# Patient Record
Sex: FEMALE | Race: WHITE | NOT HISPANIC OR LATINO | URBAN - METROPOLITAN AREA
[De-identification: names, ages, dates, MRNs, and addresses within clinical notes are randomized per-mention and may not be internally consistent; named-entity substitution may affect disease eponyms.]

---

## 2017-11-27 ENCOUNTER — OFFICE (OUTPATIENT)
Dept: URBAN - METROPOLITAN AREA CLINIC 10 | Facility: CLINIC | Age: 62
End: 2017-11-27
Payer: COMMERCIAL

## 2017-11-27 VITALS
SYSTOLIC BLOOD PRESSURE: 153 MMHG | WEIGHT: 170 LBS | HEART RATE: 65 BPM | HEIGHT: 62 IN | DIASTOLIC BLOOD PRESSURE: 80 MMHG

## 2017-11-27 DIAGNOSIS — K58.9 IRRITABLE BOWEL SYNDROME WITHOUT DIARRHEA: ICD-10-CM

## 2017-11-27 PROCEDURE — 99212 OFFICE O/P EST SF 10 MIN: CPT | Performed by: INTERNAL MEDICINE

## 2017-11-27 RX ORDER — RANITIDINE 300 MG/1
TABLET ORAL
Qty: 60 | Refills: 5 | Status: COMPLETED
End: 2017-11-27

## 2018-05-21 ENCOUNTER — OFFICE (OUTPATIENT)
Dept: URBAN - METROPOLITAN AREA CLINIC 10 | Facility: CLINIC | Age: 63
End: 2018-05-21

## 2018-05-21 VITALS
SYSTOLIC BLOOD PRESSURE: 130 MMHG | DIASTOLIC BLOOD PRESSURE: 61 MMHG | HEART RATE: 86 BPM | HEIGHT: 62 IN | WEIGHT: 158 LBS

## 2018-05-21 DIAGNOSIS — C18.9 MALIGNANT NEOPLASM OF COLON, UNSPECIFIED: ICD-10-CM

## 2018-05-21 LAB
MICROSCOPIC EXAMINATION: BACTERIA: (no result)
MICROSCOPIC EXAMINATION: CAST TYPE: (no result)
MICROSCOPIC EXAMINATION: CASTS: (no result)
MICROSCOPIC EXAMINATION: COMMENT: (no result)
MICROSCOPIC EXAMINATION: CRYSTAL TYPE: (no result)
MICROSCOPIC EXAMINATION: CRYSTALS: (no result)
MICROSCOPIC EXAMINATION: EPITHELIAL CELLS (NON RENAL): (no result) /HPF
MICROSCOPIC EXAMINATION: EPITHELIAL CELLS (RENAL): (no result)
MICROSCOPIC EXAMINATION: MUCUS THREADS: PRESENT
MICROSCOPIC EXAMINATION: RBC: (no result) /HPF
MICROSCOPIC EXAMINATION: TRICHOMONAS: (no result)
MICROSCOPIC EXAMINATION: WBC: (no result) /HPF
MICROSCOPIC EXAMINATION: YEAST: (no result)
URINALYSIS, COMPLETE: APPEARANCE: CLEAR
URINALYSIS, COMPLETE: BILIRUBIN: NEGATIVE
URINALYSIS, COMPLETE: GLUCOSE: NEGATIVE
URINALYSIS, COMPLETE: KETONES: NEGATIVE
URINALYSIS, COMPLETE: MICROSCOPIC EXAMINATION: (no result)
URINALYSIS, COMPLETE: MICROSCOPIC EXAMINATION: (no result)
URINALYSIS, COMPLETE: NITRITE, URINE: NEGATIVE
URINALYSIS, COMPLETE: OCCULT BLOOD: NEGATIVE
URINALYSIS, COMPLETE: PH: 8 — HIGH (ref 5–7.5)
URINALYSIS, COMPLETE: PROTEIN: NEGATIVE
URINALYSIS, COMPLETE: SPECIFIC GRAVITY: 1.02 (ref 1–1.03)
URINALYSIS, COMPLETE: URINE-COLOR: YELLOW
URINALYSIS, COMPLETE: UROBILINOGEN,SEMI-QN: 0.2 MG/DL (ref 0.2–1)
URINALYSIS, COMPLETE: WBC ESTERASE: NEGATIVE

## 2018-05-21 PROCEDURE — 99213 OFFICE O/P EST LOW 20 MIN: CPT | Performed by: INTERNAL MEDICINE

## 2018-05-21 RX ORDER — POLYETHYLENE GLYCOL 3350, SODIUM SULFATE, SODIUM CHLORIDE, POTASSIUM CHLORIDE, ASCORBIC ACID, SODIUM ASCORBATE 7.5-2.691G
KIT ORAL
Qty: 1 | Refills: 0 | Status: COMPLETED
Start: 2018-05-21 | End: 2018-07-19

## 2018-07-19 ENCOUNTER — OFFICE (OUTPATIENT)
Dept: URBAN - METROPOLITAN AREA PATHOLOGY 22 | Facility: PATHOLOGY | Age: 63
End: 2018-07-19

## 2018-07-19 ENCOUNTER — AMBULATORY SURGICAL CENTER (OUTPATIENT)
Dept: URBAN - METROPOLITAN AREA SURGERY 1 | Facility: SURGERY | Age: 63
End: 2018-07-19

## 2018-07-19 ENCOUNTER — AMBULATORY SURGICAL CENTER (OUTPATIENT)
Dept: URBAN - METROPOLITAN AREA SURGERY 1 | Facility: SURGERY | Age: 63
End: 2018-07-19
Payer: COMMERCIAL

## 2018-07-19 VITALS
DIASTOLIC BLOOD PRESSURE: 72 MMHG | OXYGEN SATURATION: 98 % | RESPIRATION RATE: 16 BRPM | TEMPERATURE: 99.6 F | TEMPERATURE: 98.3 F | SYSTOLIC BLOOD PRESSURE: 127 MMHG | SYSTOLIC BLOOD PRESSURE: 141 MMHG | TEMPERATURE: 98.3 F | SYSTOLIC BLOOD PRESSURE: 127 MMHG | HEIGHT: 62 IN | WEIGHT: 155 LBS | HEART RATE: 86 BPM | DIASTOLIC BLOOD PRESSURE: 70 MMHG | DIASTOLIC BLOOD PRESSURE: 61 MMHG | SYSTOLIC BLOOD PRESSURE: 138 MMHG | SYSTOLIC BLOOD PRESSURE: 141 MMHG | HEART RATE: 83 BPM | WEIGHT: 155 LBS | HEIGHT: 62 IN | DIASTOLIC BLOOD PRESSURE: 69 MMHG | DIASTOLIC BLOOD PRESSURE: 62 MMHG | DIASTOLIC BLOOD PRESSURE: 72 MMHG | DIASTOLIC BLOOD PRESSURE: 69 MMHG | DIASTOLIC BLOOD PRESSURE: 61 MMHG | DIASTOLIC BLOOD PRESSURE: 70 MMHG | HEART RATE: 70 BPM | OXYGEN SATURATION: 99 % | HEART RATE: 79 BPM | OXYGEN SATURATION: 98 % | HEART RATE: 79 BPM | SYSTOLIC BLOOD PRESSURE: 134 MMHG | RESPIRATION RATE: 16 BRPM | OXYGEN SATURATION: 96 % | HEART RATE: 70 BPM | TEMPERATURE: 99.6 F | HEART RATE: 83 BPM | HEART RATE: 86 BPM | SYSTOLIC BLOOD PRESSURE: 138 MMHG | DIASTOLIC BLOOD PRESSURE: 62 MMHG | OXYGEN SATURATION: 99 % | OXYGEN SATURATION: 96 % | SYSTOLIC BLOOD PRESSURE: 135 MMHG | SYSTOLIC BLOOD PRESSURE: 135 MMHG | SYSTOLIC BLOOD PRESSURE: 134 MMHG

## 2018-07-19 DIAGNOSIS — K62.89 OTHER SPECIFIED DISEASES OF ANUS AND RECTUM: ICD-10-CM

## 2018-07-19 DIAGNOSIS — Z15.09 GENETIC SUSCEPTIBILITY TO OTHER MALIGNANT NEOPLASM: ICD-10-CM

## 2018-07-19 DIAGNOSIS — K29.50 UNSPECIFIED CHRONIC GASTRITIS WITHOUT BLEEDING: ICD-10-CM

## 2018-07-19 DIAGNOSIS — D12.2 BENIGN NEOPLASM OF ASCENDING COLON: ICD-10-CM

## 2018-07-19 DIAGNOSIS — K63.5 POLYP OF COLON: ICD-10-CM

## 2018-07-19 DIAGNOSIS — K31.7 POLYP OF STOMACH AND DUODENUM: ICD-10-CM

## 2018-07-19 PROBLEM — Z86.010 SURVEILLANCE DUE TO PRIOR COLONIC NEOPLASIA: Status: ACTIVE | Noted: 2018-07-19

## 2018-07-19 PROCEDURE — 88342 IMHCHEM/IMCYTCHM 1ST ANTB: CPT | Performed by: INTERNAL MEDICINE

## 2018-07-19 PROCEDURE — 43239 EGD BIOPSY SINGLE/MULTIPLE: CPT | Mod: 51 | Performed by: INTERNAL MEDICINE

## 2018-07-19 PROCEDURE — 88313 SPECIAL STAINS GROUP 2: CPT | Performed by: INTERNAL MEDICINE

## 2018-07-19 PROCEDURE — 45380 COLONOSCOPY AND BIOPSY: CPT | Mod: 33 | Performed by: INTERNAL MEDICINE

## 2018-07-19 PROCEDURE — 88305 TISSUE EXAM BY PATHOLOGIST: CPT | Performed by: INTERNAL MEDICINE

## 2018-07-19 PROCEDURE — 88341 IMHCHEM/IMCYTCHM EA ADD ANTB: CPT | Performed by: INTERNAL MEDICINE

## 2018-11-19 ENCOUNTER — OFFICE (OUTPATIENT)
Dept: URBAN - METROPOLITAN AREA CLINIC 10 | Facility: CLINIC | Age: 63
End: 2018-11-19
Payer: COMMERCIAL

## 2018-11-19 VITALS
WEIGHT: 162 LBS | DIASTOLIC BLOOD PRESSURE: 63 MMHG | HEART RATE: 79 BPM | SYSTOLIC BLOOD PRESSURE: 122 MMHG | HEIGHT: 62 IN

## 2018-11-19 DIAGNOSIS — K59.00 CONSTIPATION, UNSPECIFIED: ICD-10-CM

## 2018-11-19 DIAGNOSIS — R10.32 LEFT LOWER QUADRANT PAIN: ICD-10-CM

## 2018-11-19 PROCEDURE — 99213 OFFICE O/P EST LOW 20 MIN: CPT | Performed by: INTERNAL MEDICINE

## 2018-11-19 RX ORDER — PANTOPRAZOLE SODIUM 40 MG/1
TABLET, DELAYED RELEASE ORAL
Qty: 90 | Refills: 3 | Status: COMPLETED
End: 2018-11-19

## 2019-05-20 ENCOUNTER — OFFICE (OUTPATIENT)
Dept: URBAN - METROPOLITAN AREA CLINIC 10 | Facility: CLINIC | Age: 64
End: 2019-05-20

## 2019-05-20 VITALS
HEART RATE: 79 BPM | DIASTOLIC BLOOD PRESSURE: 71 MMHG | HEIGHT: 62 IN | SYSTOLIC BLOOD PRESSURE: 127 MMHG | WEIGHT: 161 LBS

## 2019-05-20 DIAGNOSIS — Z15.09 GENETIC SUSCEPTIBILITY TO OTHER MALIGNANT NEOPLASM: ICD-10-CM

## 2019-05-20 DIAGNOSIS — R14.0 ABDOMINAL DISTENSION (GASEOUS): ICD-10-CM

## 2019-05-20 PROBLEM — Z12.11 SCREENING FOR COLONIC NEOPLASIA: Status: ACTIVE | Noted: 2018-07-19

## 2019-05-20 PROCEDURE — 99213 OFFICE O/P EST LOW 20 MIN: CPT | Performed by: INTERNAL MEDICINE

## 2019-07-22 ENCOUNTER — AMBULATORY SURGICAL CENTER (OUTPATIENT)
Dept: URBAN - METROPOLITAN AREA SURGERY 1 | Facility: SURGERY | Age: 64
End: 2019-07-22

## 2019-07-22 ENCOUNTER — OFFICE (OUTPATIENT)
Dept: URBAN - METROPOLITAN AREA PATHOLOGY 22 | Facility: PATHOLOGY | Age: 64
End: 2019-07-22

## 2019-07-22 VITALS
SYSTOLIC BLOOD PRESSURE: 125 MMHG | SYSTOLIC BLOOD PRESSURE: 117 MMHG | HEIGHT: 62 IN | SYSTOLIC BLOOD PRESSURE: 129 MMHG | HEART RATE: 71 BPM | HEART RATE: 74 BPM | HEART RATE: 67 BPM | RESPIRATION RATE: 15 BRPM | HEART RATE: 73 BPM | SYSTOLIC BLOOD PRESSURE: 109 MMHG | DIASTOLIC BLOOD PRESSURE: 62 MMHG | DIASTOLIC BLOOD PRESSURE: 62 MMHG | HEART RATE: 74 BPM | OXYGEN SATURATION: 97 % | OXYGEN SATURATION: 97 % | HEIGHT: 62 IN | DIASTOLIC BLOOD PRESSURE: 76 MMHG | HEART RATE: 67 BPM | OXYGEN SATURATION: 99 % | RESPIRATION RATE: 10 BRPM | DIASTOLIC BLOOD PRESSURE: 66 MMHG | RESPIRATION RATE: 15 BRPM | HEART RATE: 73 BPM | SYSTOLIC BLOOD PRESSURE: 129 MMHG | OXYGEN SATURATION: 94 % | RESPIRATION RATE: 16 BRPM | TEMPERATURE: 98.8 F | HEART RATE: 77 BPM | OXYGEN SATURATION: 98 % | HEART RATE: 71 BPM | SYSTOLIC BLOOD PRESSURE: 109 MMHG | OXYGEN SATURATION: 94 % | DIASTOLIC BLOOD PRESSURE: 66 MMHG | TEMPERATURE: 97.7 F | SYSTOLIC BLOOD PRESSURE: 125 MMHG | OXYGEN SATURATION: 99 % | HEART RATE: 74 BPM | RESPIRATION RATE: 16 BRPM | OXYGEN SATURATION: 98 % | DIASTOLIC BLOOD PRESSURE: 62 MMHG | SYSTOLIC BLOOD PRESSURE: 125 MMHG | HEART RATE: 73 BPM | HEART RATE: 77 BPM | WEIGHT: 156 LBS | DIASTOLIC BLOOD PRESSURE: 76 MMHG | RESPIRATION RATE: 10 BRPM | HEIGHT: 62 IN | WEIGHT: 156 LBS | DIASTOLIC BLOOD PRESSURE: 66 MMHG | OXYGEN SATURATION: 97 % | SYSTOLIC BLOOD PRESSURE: 139 MMHG | DIASTOLIC BLOOD PRESSURE: 76 MMHG | RESPIRATION RATE: 15 BRPM | TEMPERATURE: 97.7 F | OXYGEN SATURATION: 99 % | SYSTOLIC BLOOD PRESSURE: 117 MMHG | WEIGHT: 156 LBS | SYSTOLIC BLOOD PRESSURE: 129 MMHG | RESPIRATION RATE: 10 BRPM | TEMPERATURE: 97.7 F | TEMPERATURE: 98.8 F | SYSTOLIC BLOOD PRESSURE: 109 MMHG | OXYGEN SATURATION: 98 % | HEART RATE: 67 BPM | SYSTOLIC BLOOD PRESSURE: 139 MMHG | HEART RATE: 71 BPM | HEART RATE: 77 BPM | SYSTOLIC BLOOD PRESSURE: 117 MMHG | RESPIRATION RATE: 16 BRPM | SYSTOLIC BLOOD PRESSURE: 139 MMHG | OXYGEN SATURATION: 94 % | TEMPERATURE: 98.8 F

## 2019-07-22 DIAGNOSIS — K31.89 OTHER DISEASES OF STOMACH AND DUODENUM: ICD-10-CM

## 2019-07-22 DIAGNOSIS — Z15.09 GENETIC SUSCEPTIBILITY TO OTHER MALIGNANT NEOPLASM: ICD-10-CM

## 2019-07-22 DIAGNOSIS — K31.7 POLYP OF STOMACH AND DUODENUM: ICD-10-CM

## 2019-07-22 DIAGNOSIS — R14.0 ABDOMINAL DISTENSION (GASEOUS): ICD-10-CM

## 2019-07-22 DIAGNOSIS — K29.50 UNSPECIFIED CHRONIC GASTRITIS WITHOUT BLEEDING: ICD-10-CM

## 2019-07-22 DIAGNOSIS — K62.89 OTHER SPECIFIED DISEASES OF ANUS AND RECTUM: ICD-10-CM

## 2019-07-22 PROCEDURE — 43239 EGD BIOPSY SINGLE/MULTIPLE: CPT | Mod: 51 | Performed by: INTERNAL MEDICINE

## 2019-07-22 PROCEDURE — 88313 SPECIAL STAINS GROUP 2: CPT | Performed by: INTERNAL MEDICINE

## 2019-07-22 PROCEDURE — 45378 DIAGNOSTIC COLONOSCOPY: CPT | Performed by: INTERNAL MEDICINE

## 2019-07-22 PROCEDURE — 88305 TISSUE EXAM BY PATHOLOGIST: CPT | Performed by: INTERNAL MEDICINE

## 2019-07-22 PROCEDURE — 88342 IMHCHEM/IMCYTCHM 1ST ANTB: CPT | Performed by: INTERNAL MEDICINE

## 2019-12-30 ENCOUNTER — OFFICE (OUTPATIENT)
Dept: URBAN - METROPOLITAN AREA CLINIC 10 | Facility: CLINIC | Age: 64
End: 2019-12-30

## 2019-12-30 VITALS
DIASTOLIC BLOOD PRESSURE: 67 MMHG | SYSTOLIC BLOOD PRESSURE: 127 MMHG | WEIGHT: 165 LBS | HEIGHT: 62 IN | HEART RATE: 75 BPM

## 2019-12-30 DIAGNOSIS — Z86.010 PERSONAL HISTORY OF COLONIC POLYPS: ICD-10-CM

## 2019-12-30 DIAGNOSIS — K59.00 CONSTIPATION, UNSPECIFIED: ICD-10-CM

## 2019-12-30 DIAGNOSIS — R14.0 ABDOMINAL DISTENSION (GASEOUS): ICD-10-CM

## 2019-12-30 PROCEDURE — 99213 OFFICE O/P EST LOW 20 MIN: CPT | Performed by: PHYSICIAN ASSISTANT

## 2019-12-30 RX ORDER — HYOSCYAMINE SULFATE 0.12 MG/1
TABLET, ORALLY DISINTEGRATING ORAL
Qty: 270 | Refills: 3 | Status: COMPLETED
End: 2021-07-28

## 2020-07-13 ENCOUNTER — OFFICE (OUTPATIENT)
Dept: URBAN - METROPOLITAN AREA CLINIC 10 | Facility: CLINIC | Age: 65
End: 2020-07-13

## 2020-07-13 VITALS
WEIGHT: 162 LBS | HEART RATE: 82 BPM | SYSTOLIC BLOOD PRESSURE: 138 MMHG | DIASTOLIC BLOOD PRESSURE: 74 MMHG | HEIGHT: 62 IN

## 2020-07-13 DIAGNOSIS — K58.9 IRRITABLE BOWEL SYNDROME WITHOUT DIARRHEA: ICD-10-CM

## 2020-07-13 PROCEDURE — 99213 OFFICE O/P EST LOW 20 MIN: CPT | Performed by: INTERNAL MEDICINE

## 2020-07-13 RX ORDER — SODIUM SULFATE, POTASSIUM SULFATE, MAGNESIUM SULFATE 17.5; 3.13; 1.6 G/ML; G/ML; G/ML
SOLUTION, CONCENTRATE ORAL
Qty: 1 | Refills: 0 | Status: COMPLETED
Start: 2020-07-13 | End: 2020-10-19

## 2020-10-19 ENCOUNTER — AMBULATORY SURGICAL CENTER (OUTPATIENT)
Dept: URBAN - METROPOLITAN AREA SURGERY 1 | Facility: SURGERY | Age: 65
End: 2020-10-19

## 2020-10-19 ENCOUNTER — AMBULATORY SURGICAL CENTER (OUTPATIENT)
Dept: URBAN - METROPOLITAN AREA SURGERY 1 | Facility: SURGERY | Age: 65
End: 2020-10-19
Payer: COMMERCIAL

## 2020-10-19 ENCOUNTER — OFFICE (OUTPATIENT)
Dept: URBAN - METROPOLITAN AREA PATHOLOGY 22 | Facility: PATHOLOGY | Age: 65
End: 2020-10-19
Payer: COMMERCIAL

## 2020-10-19 VITALS
HEART RATE: 72 BPM | DIASTOLIC BLOOD PRESSURE: 65 MMHG | RESPIRATION RATE: 20 BRPM | SYSTOLIC BLOOD PRESSURE: 130 MMHG | HEART RATE: 84 BPM | SYSTOLIC BLOOD PRESSURE: 124 MMHG | TEMPERATURE: 97.9 F | DIASTOLIC BLOOD PRESSURE: 76 MMHG | HEART RATE: 77 BPM | SYSTOLIC BLOOD PRESSURE: 140 MMHG | WEIGHT: 154 LBS | DIASTOLIC BLOOD PRESSURE: 65 MMHG | TEMPERATURE: 98.7 F | DIASTOLIC BLOOD PRESSURE: 68 MMHG | SYSTOLIC BLOOD PRESSURE: 154 MMHG | DIASTOLIC BLOOD PRESSURE: 63 MMHG | DIASTOLIC BLOOD PRESSURE: 60 MMHG | HEART RATE: 72 BPM | TEMPERATURE: 98.7 F | DIASTOLIC BLOOD PRESSURE: 60 MMHG | OXYGEN SATURATION: 96 % | SYSTOLIC BLOOD PRESSURE: 141 MMHG | SYSTOLIC BLOOD PRESSURE: 137 MMHG | SYSTOLIC BLOOD PRESSURE: 141 MMHG | WEIGHT: 154 LBS | RESPIRATION RATE: 16 BRPM | HEART RATE: 77 BPM | HEART RATE: 72 BPM | SYSTOLIC BLOOD PRESSURE: 130 MMHG | HEART RATE: 76 BPM | DIASTOLIC BLOOD PRESSURE: 68 MMHG | DIASTOLIC BLOOD PRESSURE: 73 MMHG | DIASTOLIC BLOOD PRESSURE: 73 MMHG | SYSTOLIC BLOOD PRESSURE: 130 MMHG | HEART RATE: 76 BPM | OXYGEN SATURATION: 96 % | TEMPERATURE: 97.9 F | SYSTOLIC BLOOD PRESSURE: 124 MMHG | HEIGHT: 62 IN | RESPIRATION RATE: 16 BRPM | DIASTOLIC BLOOD PRESSURE: 63 MMHG | OXYGEN SATURATION: 97 % | TEMPERATURE: 98.7 F | RESPIRATION RATE: 20 BRPM | HEART RATE: 69 BPM | HEART RATE: 67 BPM | DIASTOLIC BLOOD PRESSURE: 68 MMHG | DIASTOLIC BLOOD PRESSURE: 76 MMHG | SYSTOLIC BLOOD PRESSURE: 154 MMHG | HEART RATE: 76 BPM | TEMPERATURE: 97.9 F | RESPIRATION RATE: 20 BRPM | OXYGEN SATURATION: 94 % | WEIGHT: 154 LBS | SYSTOLIC BLOOD PRESSURE: 124 MMHG | DIASTOLIC BLOOD PRESSURE: 63 MMHG | OXYGEN SATURATION: 95 % | HEART RATE: 67 BPM | OXYGEN SATURATION: 95 % | SYSTOLIC BLOOD PRESSURE: 137 MMHG | OXYGEN SATURATION: 96 % | SYSTOLIC BLOOD PRESSURE: 140 MMHG | DIASTOLIC BLOOD PRESSURE: 65 MMHG | OXYGEN SATURATION: 95 % | SYSTOLIC BLOOD PRESSURE: 154 MMHG | HEART RATE: 84 BPM | HEART RATE: 77 BPM | HEIGHT: 62 IN | HEART RATE: 69 BPM | DIASTOLIC BLOOD PRESSURE: 60 MMHG | RESPIRATION RATE: 16 BRPM | SYSTOLIC BLOOD PRESSURE: 137 MMHG | SYSTOLIC BLOOD PRESSURE: 141 MMHG | HEART RATE: 69 BPM | OXYGEN SATURATION: 97 % | HEIGHT: 62 IN | DIASTOLIC BLOOD PRESSURE: 73 MMHG | DIASTOLIC BLOOD PRESSURE: 76 MMHG | HEART RATE: 84 BPM | SYSTOLIC BLOOD PRESSURE: 140 MMHG | OXYGEN SATURATION: 94 % | HEART RATE: 67 BPM | OXYGEN SATURATION: 97 % | OXYGEN SATURATION: 94 %

## 2020-10-19 DIAGNOSIS — D12.3 BENIGN NEOPLASM OF TRANSVERSE COLON: ICD-10-CM

## 2020-10-19 DIAGNOSIS — K29.50 UNSPECIFIED CHRONIC GASTRITIS WITHOUT BLEEDING: ICD-10-CM

## 2020-10-19 DIAGNOSIS — Z15.09 GENETIC SUSCEPTIBILITY TO OTHER MALIGNANT NEOPLASM: ICD-10-CM

## 2020-10-19 DIAGNOSIS — K63.5 POLYP OF COLON: ICD-10-CM

## 2020-10-19 DIAGNOSIS — K31.7 POLYP OF STOMACH AND DUODENUM: ICD-10-CM

## 2020-10-19 PROCEDURE — 43239 EGD BIOPSY SINGLE/MULTIPLE: CPT | Mod: 51 | Performed by: INTERNAL MEDICINE

## 2020-10-19 PROCEDURE — 88341 IMHCHEM/IMCYTCHM EA ADD ANTB: CPT | Performed by: INTERNAL MEDICINE

## 2020-10-19 PROCEDURE — 45380 COLONOSCOPY AND BIOPSY: CPT | Mod: 59 | Performed by: INTERNAL MEDICINE

## 2020-10-19 PROCEDURE — 88342 IMHCHEM/IMCYTCHM 1ST ANTB: CPT | Performed by: INTERNAL MEDICINE

## 2020-10-19 PROCEDURE — 45385 COLONOSCOPY W/LESION REMOVAL: CPT | Performed by: INTERNAL MEDICINE

## 2020-10-19 PROCEDURE — 88305 TISSUE EXAM BY PATHOLOGIST: CPT | Performed by: INTERNAL MEDICINE

## 2020-10-19 PROCEDURE — 88313 SPECIAL STAINS GROUP 2: CPT | Performed by: INTERNAL MEDICINE

## 2020-10-19 NOTE — SERVICENOTES
Start time: 0857
Cecum: 0900
TI intubation: unable
End time: 0909

[place] Bowel Prep Score :  9
Right colon:                       3
transverse colon:               3
left colon:                          3

## 2020-10-19 NOTE — SERVICENOTES
Start time: 0857
Cecum: 0900
TI intubation: unable
End time: 0909

[place] Bowel Prep Score :  9
Right colon:                       3
transverse colon:               3
left colon:                          3

## 2020-10-19 NOTE — SERVICENOTES
Start time: 0857
Cecum: 0900
TI intubation: unable
End time: 0909

[place] Bowel Prep Score :  9
Right colon:                       3
transverse colon:               3
left colon:                          3

## 2021-04-21 ENCOUNTER — OFFICE (OUTPATIENT)
Dept: URBAN - METROPOLITAN AREA CLINIC 10 | Facility: CLINIC | Age: 66
End: 2021-04-21

## 2021-04-21 VITALS
DIASTOLIC BLOOD PRESSURE: 71 MMHG | SYSTOLIC BLOOD PRESSURE: 121 MMHG | HEIGHT: 62 IN | WEIGHT: 157 LBS | HEART RATE: 84 BPM

## 2021-04-21 DIAGNOSIS — Z15.09 GENETIC SUSCEPTIBILITY TO OTHER MALIGNANT NEOPLASM: ICD-10-CM

## 2021-04-21 DIAGNOSIS — K58.9 IRRITABLE BOWEL SYNDROME WITHOUT DIARRHEA: ICD-10-CM

## 2021-04-21 PROCEDURE — 99213 OFFICE O/P EST LOW 20 MIN: CPT | Performed by: PHYSICIAN ASSISTANT

## 2021-04-21 RX ORDER — SODIUM SULFATE, POTASSIUM SULFATE, MAGNESIUM SULFATE 17.5; 3.13; 1.6 G/ML; G/ML; G/ML
SOLUTION, CONCENTRATE ORAL
Qty: 1 | Refills: 0 | Status: COMPLETED
Start: 2021-04-21 | End: 2021-11-17

## 2021-11-17 ENCOUNTER — AMBULATORY SURGICAL CENTER (OUTPATIENT)
Dept: URBAN - METROPOLITAN AREA SURGERY 1 | Facility: SURGERY | Age: 66
End: 2021-11-17
Payer: COMMERCIAL

## 2021-11-17 ENCOUNTER — OFFICE (OUTPATIENT)
Dept: URBAN - METROPOLITAN AREA PATHOLOGY 22 | Facility: PATHOLOGY | Age: 66
End: 2021-11-17

## 2021-11-17 VITALS
SYSTOLIC BLOOD PRESSURE: 122 MMHG | RESPIRATION RATE: 16 BRPM | DIASTOLIC BLOOD PRESSURE: 62 MMHG | OXYGEN SATURATION: 96 % | RESPIRATION RATE: 16 BRPM | RESPIRATION RATE: 17 BRPM | TEMPERATURE: 97.2 F | DIASTOLIC BLOOD PRESSURE: 73 MMHG | SYSTOLIC BLOOD PRESSURE: 122 MMHG | TEMPERATURE: 97.4 F | DIASTOLIC BLOOD PRESSURE: 62 MMHG | HEART RATE: 79 BPM | HEART RATE: 79 BPM | TEMPERATURE: 97.4 F | HEART RATE: 73 BPM | DIASTOLIC BLOOD PRESSURE: 55 MMHG | TEMPERATURE: 97.4 F | HEART RATE: 76 BPM | RESPIRATION RATE: 16 BRPM | DIASTOLIC BLOOD PRESSURE: 64 MMHG | TEMPERATURE: 97.2 F | DIASTOLIC BLOOD PRESSURE: 64 MMHG | HEART RATE: 76 BPM | SYSTOLIC BLOOD PRESSURE: 121 MMHG | HEART RATE: 66 BPM | HEIGHT: 62 IN | OXYGEN SATURATION: 98 % | HEART RATE: 76 BPM | DIASTOLIC BLOOD PRESSURE: 62 MMHG | RESPIRATION RATE: 18 BRPM | WEIGHT: 152 LBS | SYSTOLIC BLOOD PRESSURE: 132 MMHG | HEIGHT: 62 IN | SYSTOLIC BLOOD PRESSURE: 121 MMHG | OXYGEN SATURATION: 98 % | OXYGEN SATURATION: 94 % | HEART RATE: 66 BPM | OXYGEN SATURATION: 94 % | OXYGEN SATURATION: 100 % | DIASTOLIC BLOOD PRESSURE: 73 MMHG | SYSTOLIC BLOOD PRESSURE: 110 MMHG | DIASTOLIC BLOOD PRESSURE: 73 MMHG | OXYGEN SATURATION: 100 % | SYSTOLIC BLOOD PRESSURE: 122 MMHG | DIASTOLIC BLOOD PRESSURE: 63 MMHG | HEART RATE: 73 BPM | RESPIRATION RATE: 17 BRPM | WEIGHT: 152 LBS | OXYGEN SATURATION: 98 % | OXYGEN SATURATION: 96 % | SYSTOLIC BLOOD PRESSURE: 98 MMHG | DIASTOLIC BLOOD PRESSURE: 55 MMHG | OXYGEN SATURATION: 96 % | SYSTOLIC BLOOD PRESSURE: 132 MMHG | DIASTOLIC BLOOD PRESSURE: 63 MMHG | SYSTOLIC BLOOD PRESSURE: 110 MMHG | SYSTOLIC BLOOD PRESSURE: 121 MMHG | RESPIRATION RATE: 18 BRPM | DIASTOLIC BLOOD PRESSURE: 63 MMHG | DIASTOLIC BLOOD PRESSURE: 64 MMHG | RESPIRATION RATE: 18 BRPM | OXYGEN SATURATION: 94 % | RESPIRATION RATE: 17 BRPM | SYSTOLIC BLOOD PRESSURE: 98 MMHG | WEIGHT: 152 LBS | HEART RATE: 66 BPM | SYSTOLIC BLOOD PRESSURE: 98 MMHG | SYSTOLIC BLOOD PRESSURE: 110 MMHG | HEART RATE: 79 BPM | TEMPERATURE: 97.2 F | HEIGHT: 62 IN | OXYGEN SATURATION: 100 % | SYSTOLIC BLOOD PRESSURE: 132 MMHG | DIASTOLIC BLOOD PRESSURE: 55 MMHG | HEART RATE: 73 BPM

## 2021-11-17 DIAGNOSIS — D12.3 BENIGN NEOPLASM OF TRANSVERSE COLON: ICD-10-CM

## 2021-11-17 DIAGNOSIS — D12.2 BENIGN NEOPLASM OF ASCENDING COLON: ICD-10-CM

## 2021-11-17 DIAGNOSIS — Z86.010 PERSONAL HISTORY OF COLONIC POLYPS: ICD-10-CM

## 2021-11-17 DIAGNOSIS — Z15.09 GENETIC SUSCEPTIBILITY TO OTHER MALIGNANT NEOPLASM: ICD-10-CM

## 2021-11-17 PROCEDURE — 88305 TISSUE EXAM BY PATHOLOGIST: CPT | Performed by: INTERNAL MEDICINE

## 2021-11-17 PROCEDURE — 45380 COLONOSCOPY AND BIOPSY: CPT | Mod: 59 | Performed by: INTERNAL MEDICINE

## 2021-11-17 PROCEDURE — 45385 COLONOSCOPY W/LESION REMOVAL: CPT | Performed by: INTERNAL MEDICINE

## 2021-11-17 NOTE — SERVICENOTES
Start:1048
Cecum:1052
TI intubation: yes
End:1101

[place] Bowel Prep Score:     6
-right colon:                              2
-transverse colon:                   2
-left colon:                                 2

## 2021-11-17 NOTE — SERVICENOTES
Start:1048
Cecum:1052
TI intubation: yes
End:1101

[place] Bowel Prep Score:     6
-right colon:                              2
-transverse colon:                   2
-left colon:                                 2

## 2021-11-17 NOTE — SERVICENOTES
Start:1048
Cecum:1052
TI intubation: yes
End:1101

[place] Bowel Prep Score:     6
-right colon:                              2
-transverse colon:                   2
-left colon:                                 2

## 2022-11-17 ENCOUNTER — OFFICE (OUTPATIENT)
Dept: URBAN - METROPOLITAN AREA CLINIC 10 | Facility: CLINIC | Age: 67
End: 2022-11-17

## 2022-11-17 VITALS
DIASTOLIC BLOOD PRESSURE: 61 MMHG | OXYGEN SATURATION: 96 % | WEIGHT: 165 LBS | SYSTOLIC BLOOD PRESSURE: 112 MMHG | HEART RATE: 81 BPM | HEIGHT: 62 IN

## 2022-11-17 DIAGNOSIS — R10.13 EPIGASTRIC PAIN: ICD-10-CM

## 2022-11-17 DIAGNOSIS — K58.9 IRRITABLE BOWEL SYNDROME WITHOUT DIARRHEA: ICD-10-CM

## 2022-11-17 DIAGNOSIS — R11.0 NAUSEA: ICD-10-CM

## 2022-11-17 PROCEDURE — 99214 OFFICE O/P EST MOD 30 MIN: CPT | Performed by: INTERNAL MEDICINE

## 2022-11-17 RX ORDER — HYOSCYAMINE SULFATE 0.12 MG/1
TABLET ORAL
Qty: 90 | Refills: 6 | Status: ACTIVE

## 2022-11-17 RX ORDER — SODIUM SULFATE, POTASSIUM SULFATE, MAGNESIUM SULFATE 17.5; 3.13; 1.6 G/ML; G/ML; G/ML
SOLUTION, CONCENTRATE ORAL
Qty: 354 | Refills: 0 | Status: COMPLETED
Start: 2022-11-17 | End: 2022-12-27

## 2022-12-27 ENCOUNTER — AMBULATORY SURGICAL CENTER (OUTPATIENT)
Dept: URBAN - METROPOLITAN AREA SURGERY 3 | Facility: SURGERY | Age: 67
End: 2022-12-27
Payer: COMMERCIAL

## 2022-12-27 ENCOUNTER — AMBULATORY SURGICAL CENTER (OUTPATIENT)
Dept: URBAN - METROPOLITAN AREA SURGERY 3 | Facility: SURGERY | Age: 67
End: 2022-12-27

## 2022-12-27 ENCOUNTER — OFFICE (OUTPATIENT)
Dept: URBAN - METROPOLITAN AREA PATHOLOGY 22 | Facility: PATHOLOGY | Age: 67
End: 2022-12-27

## 2022-12-27 VITALS
OXYGEN SATURATION: 100 % | RESPIRATION RATE: 20 BRPM | SYSTOLIC BLOOD PRESSURE: 105 MMHG | SYSTOLIC BLOOD PRESSURE: 126 MMHG | TEMPERATURE: 98.3 F | RESPIRATION RATE: 16 BRPM | SYSTOLIC BLOOD PRESSURE: 118 MMHG | OXYGEN SATURATION: 94 % | DIASTOLIC BLOOD PRESSURE: 70 MMHG | HEART RATE: 80 BPM | HEART RATE: 74 BPM | HEART RATE: 70 BPM | TEMPERATURE: 98.5 F | OXYGEN SATURATION: 98 % | HEART RATE: 80 BPM | HEART RATE: 77 BPM | RESPIRATION RATE: 18 BRPM | RESPIRATION RATE: 16 BRPM | HEIGHT: 62 IN | HEART RATE: 77 BPM | OXYGEN SATURATION: 96 % | OXYGEN SATURATION: 93 % | HEART RATE: 74 BPM | SYSTOLIC BLOOD PRESSURE: 138 MMHG | DIASTOLIC BLOOD PRESSURE: 70 MMHG | OXYGEN SATURATION: 94 % | SYSTOLIC BLOOD PRESSURE: 138 MMHG | SYSTOLIC BLOOD PRESSURE: 128 MMHG | WEIGHT: 162 LBS | DIASTOLIC BLOOD PRESSURE: 66 MMHG | SYSTOLIC BLOOD PRESSURE: 105 MMHG | SYSTOLIC BLOOD PRESSURE: 126 MMHG | WEIGHT: 162 LBS | DIASTOLIC BLOOD PRESSURE: 58 MMHG | OXYGEN SATURATION: 98 % | DIASTOLIC BLOOD PRESSURE: 52 MMHG | OXYGEN SATURATION: 96 % | RESPIRATION RATE: 20 BRPM | RESPIRATION RATE: 17 BRPM | DIASTOLIC BLOOD PRESSURE: 73 MMHG | DIASTOLIC BLOOD PRESSURE: 73 MMHG | OXYGEN SATURATION: 93 % | DIASTOLIC BLOOD PRESSURE: 66 MMHG | TEMPERATURE: 98.3 F | TEMPERATURE: 98.5 F | RESPIRATION RATE: 18 BRPM | OXYGEN SATURATION: 100 % | HEIGHT: 62 IN | DIASTOLIC BLOOD PRESSURE: 52 MMHG | RESPIRATION RATE: 17 BRPM | HEART RATE: 70 BPM | SYSTOLIC BLOOD PRESSURE: 118 MMHG | SYSTOLIC BLOOD PRESSURE: 128 MMHG | DIASTOLIC BLOOD PRESSURE: 58 MMHG

## 2022-12-27 DIAGNOSIS — R10.13 EPIGASTRIC PAIN: ICD-10-CM

## 2022-12-27 DIAGNOSIS — Z15.09 GENETIC SUSCEPTIBILITY TO OTHER MALIGNANT NEOPLASM: ICD-10-CM

## 2022-12-27 DIAGNOSIS — Z86.010 PERSONAL HISTORY OF COLONIC POLYPS: ICD-10-CM

## 2022-12-27 PROCEDURE — G0105 COLORECTAL SCRN; HI RISK IND: HCPCS | Performed by: INTERNAL MEDICINE

## 2022-12-27 PROCEDURE — 43239 EGD BIOPSY SINGLE/MULTIPLE: CPT | Mod: 51 | Performed by: INTERNAL MEDICINE

## 2023-06-22 ENCOUNTER — OFFICE (OUTPATIENT)
Dept: URBAN - METROPOLITAN AREA CLINIC 10 | Facility: CLINIC | Age: 68
End: 2023-06-22

## 2023-06-22 VITALS
HEIGHT: 62 IN | DIASTOLIC BLOOD PRESSURE: 76 MMHG | WEIGHT: 166 LBS | OXYGEN SATURATION: 96 % | SYSTOLIC BLOOD PRESSURE: 134 MMHG | HEART RATE: 83 BPM

## 2023-06-22 DIAGNOSIS — R05.9 COUGH, UNSPECIFIED: ICD-10-CM

## 2023-06-22 DIAGNOSIS — C18.9 MALIGNANT NEOPLASM OF COLON, UNSPECIFIED: ICD-10-CM

## 2023-06-22 DIAGNOSIS — K21.9 GASTRO-ESOPHAGEAL REFLUX DISEASE WITHOUT ESOPHAGITIS: ICD-10-CM

## 2023-06-22 PROCEDURE — 99214 OFFICE O/P EST MOD 30 MIN: CPT | Performed by: INTERNAL MEDICINE

## 2023-06-22 RX ORDER — PANTOPRAZOLE 40 MG/1
TABLET, DELAYED RELEASE ORAL
Qty: 90 | Refills: 3 | Status: ACTIVE

## 2023-06-22 NOTE — SERVICENOTES
Asked her to call if no improvement in cough at 4 weeks.  We discussed further testing for reflux such as Bravo and impedance testing; however, she would like to see if taking PPI twice daily helps.

## 2023-06-22 NOTE — SERVICEHPINOTES
Ms. Purcell is 67 years old. 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 Her only complaint today is cough. She states she saw Dr. Sandoval and there was concern that this might be reflux related as she had a negative chest x-ray. She was started on famotidine at night. She denies any improvement in her cough with taking famotidine. She does take losartan has done so for approximately 1 year. She denies dysphagia, reflux symptoms or heartburn. She sees gynecology yearly. She sees Dr. Mcgrath yearly as well.
br
br12/2022-EGD normal with negative antral biopsies.br-colonoscopy normal terminal ileum and normal colon with medium external hemorrhoids. 
br br10/19/2020: EGD and colonoscopybrEGD-normal esophagus, normal duodenum, reactive gastropathy, 2 mm hyperplastic polyp in stomach bodybrColonoscopy-6 mm sessile serrated adenoma, 2 mm benign lymphoid follicle colonoscopy repeat recommended in 1 year07/22/2019: EGD and colonoscopybrEGD-not reactive change and minimal chronic inflammation in antrumbrHyperplastic polyp and gastric fundic gland mucosa in bodybrCystic fundic gland polyp and hyperplastic polyp been stomachbrGastric fundic type mucosabrColonoscopy-normal mucosa and terminal ileum and entire colon, hypertrophied anal papilla in anusShe has had XIANG and BOO11/2018-CT scan of abdomen pelvis with contrast: Changes consistent with previous right mastectomy hysterectomy otherwise no acute findings.07/2018-EGD for HNPCC/Whitlock syndrome: Antral biopsies negative and negative for H pylori. Multiple (10-20) polyps in the stomach body and fundus that were 2-3 millimeters. Biopsies demonstrated fundic gland polyps with exception of 1 hyperplastic polyp. Normal duodenum to 3rd part of the duodenum.brin the gastric body.br-colonoscopy for HNPCC/Whitlock syndrome: Diminutive nonneoplastic polyp. Hypertrophied anal papilla. 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 Esophagus endoscopically normal with biopsies demonstrating acute esophagitis. No eosinophils.2/2016–CBC, CMP, amylase normal.9/2012- colonoscopy with internal hemorrhoids and normal random colon biopsies.She had normal amylase, lipase, TSH and anit-TTG IgA with normal IgA level (4/2011). Review of Dr. Gresham's records demonstrates EGD (10/08) with antral gastritis with biopsies demosntrating erosive gastritis/ reactive gastropathy. No colonoscopy report included

## 2023-12-27 ENCOUNTER — AMBULATORY SURGICAL CENTER (OUTPATIENT)
Dept: URBAN - METROPOLITAN AREA SURGERY 1 | Facility: SURGERY | Age: 68
End: 2023-12-27

## 2023-12-27 ENCOUNTER — AMBULATORY SURGICAL CENTER (OUTPATIENT)
Dept: URBAN - METROPOLITAN AREA SURGERY 1 | Facility: SURGERY | Age: 68
End: 2023-12-27
Payer: MEDICARE

## 2023-12-27 ENCOUNTER — OFFICE (OUTPATIENT)
Dept: URBAN - METROPOLITAN AREA PATHOLOGY 12 | Facility: PATHOLOGY | Age: 68
End: 2023-12-27
Payer: MEDICARE

## 2023-12-27 VITALS
SYSTOLIC BLOOD PRESSURE: 136 MMHG | HEART RATE: 72 BPM | OXYGEN SATURATION: 96 % | SYSTOLIC BLOOD PRESSURE: 138 MMHG | SYSTOLIC BLOOD PRESSURE: 160 MMHG | HEART RATE: 67 BPM | DIASTOLIC BLOOD PRESSURE: 72 MMHG | DIASTOLIC BLOOD PRESSURE: 72 MMHG | HEIGHT: 62 IN | OXYGEN SATURATION: 96 % | DIASTOLIC BLOOD PRESSURE: 74 MMHG | DIASTOLIC BLOOD PRESSURE: 71 MMHG | SYSTOLIC BLOOD PRESSURE: 160 MMHG | DIASTOLIC BLOOD PRESSURE: 80 MMHG | TEMPERATURE: 97.3 F | HEART RATE: 72 BPM | SYSTOLIC BLOOD PRESSURE: 132 MMHG | DIASTOLIC BLOOD PRESSURE: 72 MMHG | SYSTOLIC BLOOD PRESSURE: 138 MMHG | DIASTOLIC BLOOD PRESSURE: 74 MMHG | DIASTOLIC BLOOD PRESSURE: 71 MMHG | HEART RATE: 67 BPM | HEART RATE: 72 BPM | HEART RATE: 66 BPM | OXYGEN SATURATION: 93 % | HEART RATE: 77 BPM | SYSTOLIC BLOOD PRESSURE: 135 MMHG | HEART RATE: 77 BPM | SYSTOLIC BLOOD PRESSURE: 132 MMHG | TEMPERATURE: 97.2 F | TEMPERATURE: 97.3 F | SYSTOLIC BLOOD PRESSURE: 135 MMHG | WEIGHT: 160 LBS | TEMPERATURE: 97.2 F | OXYGEN SATURATION: 93 % | SYSTOLIC BLOOD PRESSURE: 135 MMHG | DIASTOLIC BLOOD PRESSURE: 80 MMHG | RESPIRATION RATE: 18 BRPM | HEART RATE: 66 BPM | HEART RATE: 66 BPM | OXYGEN SATURATION: 95 % | OXYGEN SATURATION: 95 % | SYSTOLIC BLOOD PRESSURE: 132 MMHG | HEIGHT: 62 IN | OXYGEN SATURATION: 93 % | SYSTOLIC BLOOD PRESSURE: 136 MMHG | DIASTOLIC BLOOD PRESSURE: 74 MMHG | HEART RATE: 77 BPM | RESPIRATION RATE: 18 BRPM | DIASTOLIC BLOOD PRESSURE: 80 MMHG | WEIGHT: 160 LBS | RESPIRATION RATE: 18 BRPM | SYSTOLIC BLOOD PRESSURE: 136 MMHG | DIASTOLIC BLOOD PRESSURE: 71 MMHG | HEIGHT: 62 IN | SYSTOLIC BLOOD PRESSURE: 138 MMHG | OXYGEN SATURATION: 96 % | HEART RATE: 67 BPM | OXYGEN SATURATION: 95 % | WEIGHT: 160 LBS | TEMPERATURE: 97.2 F | SYSTOLIC BLOOD PRESSURE: 160 MMHG | TEMPERATURE: 97.3 F

## 2023-12-27 DIAGNOSIS — K63.5 POLYP OF COLON: ICD-10-CM

## 2023-12-27 DIAGNOSIS — D12.3 BENIGN NEOPLASM OF TRANSVERSE COLON: ICD-10-CM

## 2023-12-27 DIAGNOSIS — Z86.010 PERSONAL HISTORY OF COLONIC POLYPS: ICD-10-CM

## 2023-12-27 DIAGNOSIS — Z15.09 GENETIC SUSCEPTIBILITY TO OTHER MALIGNANT NEOPLASM: ICD-10-CM

## 2023-12-27 DIAGNOSIS — Z12.11 ENCOUNTER FOR SCREENING FOR MALIGNANT NEOPLASM OF COLON: ICD-10-CM

## 2023-12-27 PROCEDURE — 88305 TISSUE EXAM BY PATHOLOGIST: CPT | Performed by: STUDENT IN AN ORGANIZED HEALTH CARE EDUCATION/TRAINING PROGRAM

## 2023-12-27 PROCEDURE — 43235 EGD DIAGNOSTIC BRUSH WASH: CPT | Mod: 51 | Performed by: INTERNAL MEDICINE

## 2023-12-27 PROCEDURE — 45380 COLONOSCOPY AND BIOPSY: CPT | Mod: PT | Performed by: INTERNAL MEDICINE

## 2023-12-27 RX ADMIN — PROPOFOL 200 MG: 10 INJECTION, EMULSION INTRAVENOUS at 09:05

## 2023-12-29 LAB
GASTRO ONE PATHOLOGY: PDF REPORT: (no result)

## 2024-08-13 ENCOUNTER — OFFICE (OUTPATIENT)
Dept: URBAN - METROPOLITAN AREA CLINIC 10 | Facility: CLINIC | Age: 69
End: 2024-08-13
Payer: MEDICARE

## 2024-08-13 VITALS
HEART RATE: 86 BPM | DIASTOLIC BLOOD PRESSURE: 60 MMHG | SYSTOLIC BLOOD PRESSURE: 129 MMHG | HEIGHT: 62 IN | OXYGEN SATURATION: 98 % | WEIGHT: 153 LBS

## 2024-08-13 DIAGNOSIS — Z86.010 PERSONAL HISTORY OF COLONIC POLYPS: ICD-10-CM

## 2024-08-13 DIAGNOSIS — K21.9 GASTRO-ESOPHAGEAL REFLUX DISEASE WITHOUT ESOPHAGITIS: ICD-10-CM

## 2024-08-13 DIAGNOSIS — C18.9 MALIGNANT NEOPLASM OF COLON, UNSPECIFIED: ICD-10-CM

## 2024-08-13 PROCEDURE — 99214 OFFICE O/P EST MOD 30 MIN: CPT | Performed by: NURSE PRACTITIONER

## 2024-08-13 RX ORDER — SODIUM SULFATE, MAGNESIUM SULFATE, AND POTASSIUM CHLORIDE 17.75; 2.7; 2.25 G/1; G/1; G/1
TABLET ORAL
Qty: 24 | Refills: 0 | Status: ACTIVE
Start: 2024-08-13

## 2024-08-13 RX ORDER — HYOSCYAMINE SULFATE 0.12 MG/1
TABLET ORAL
Qty: 90 | Refills: 6 | Status: ACTIVE

## 2024-08-13 RX ORDER — PANTOPRAZOLE 40 MG/1
TABLET, DELAYED RELEASE ORAL
Qty: 90 | Refills: 3 | Status: ACTIVE

## 2024-12-02 ENCOUNTER — AMBULATORY SURGICAL CENTER (OUTPATIENT)
Dept: URBAN - METROPOLITAN AREA SURGERY 1 | Facility: SURGERY | Age: 69
End: 2024-12-02
Payer: MEDICARE

## 2024-12-02 VITALS
SYSTOLIC BLOOD PRESSURE: 135 MMHG | OXYGEN SATURATION: 95 % | RESPIRATION RATE: 16 BRPM | DIASTOLIC BLOOD PRESSURE: 67 MMHG | HEART RATE: 81 BPM | OXYGEN SATURATION: 94 % | HEIGHT: 62 IN | TEMPERATURE: 97.9 F | DIASTOLIC BLOOD PRESSURE: 58 MMHG | RESPIRATION RATE: 18 BRPM | DIASTOLIC BLOOD PRESSURE: 51 MMHG | TEMPERATURE: 97.3 F | DIASTOLIC BLOOD PRESSURE: 64 MMHG | DIASTOLIC BLOOD PRESSURE: 55 MMHG | SYSTOLIC BLOOD PRESSURE: 108 MMHG | HEART RATE: 73 BPM | RESPIRATION RATE: 17 BRPM | HEART RATE: 69 BPM | HEART RATE: 75 BPM | SYSTOLIC BLOOD PRESSURE: 105 MMHG | WEIGHT: 151 LBS | SYSTOLIC BLOOD PRESSURE: 113 MMHG | SYSTOLIC BLOOD PRESSURE: 126 MMHG

## 2024-12-02 DIAGNOSIS — D12.4 BENIGN NEOPLASM OF DESCENDING COLON: ICD-10-CM

## 2024-12-02 DIAGNOSIS — Z85.3 PERSONAL HISTORY OF MALIGNANT NEOPLASM OF BREAST: ICD-10-CM

## 2024-12-02 DIAGNOSIS — K31.7 POLYP OF STOMACH AND DUODENUM: ICD-10-CM

## 2024-12-02 DIAGNOSIS — K29.50 UNSPECIFIED CHRONIC GASTRITIS WITHOUT BLEEDING: ICD-10-CM

## 2024-12-02 DIAGNOSIS — K31.89 OTHER DISEASES OF STOMACH AND DUODENUM: ICD-10-CM

## 2024-12-02 DIAGNOSIS — D12.5 BENIGN NEOPLASM OF SIGMOID COLON: ICD-10-CM

## 2024-12-02 DIAGNOSIS — Z15.09 GENETIC SUSCEPTIBILITY TO OTHER MALIGNANT NEOPLASM: ICD-10-CM

## 2024-12-02 PROCEDURE — 45380 COLONOSCOPY AND BIOPSY: CPT | Performed by: INTERNAL MEDICINE

## 2024-12-02 PROCEDURE — 43239 EGD BIOPSY SINGLE/MULTIPLE: CPT | Mod: 51 | Performed by: INTERNAL MEDICINE

## 2025-02-13 ENCOUNTER — OFFICE (OUTPATIENT)
Dept: URBAN - METROPOLITAN AREA CLINIC 10 | Facility: CLINIC | Age: 70
End: 2025-02-13
Payer: COMMERCIAL

## 2025-02-13 VITALS
OXYGEN SATURATION: 97 % | HEART RATE: 76 BPM | DIASTOLIC BLOOD PRESSURE: 54 MMHG | HEIGHT: 62 IN | SYSTOLIC BLOOD PRESSURE: 127 MMHG | WEIGHT: 149 LBS

## 2025-02-13 DIAGNOSIS — K21.9 GASTRO-ESOPHAGEAL REFLUX DISEASE WITHOUT ESOPHAGITIS: ICD-10-CM

## 2025-02-13 DIAGNOSIS — C18.9 MALIGNANT NEOPLASM OF COLON, UNSPECIFIED: ICD-10-CM

## 2025-02-13 DIAGNOSIS — K59.00 CONSTIPATION, UNSPECIFIED: ICD-10-CM

## 2025-02-13 PROCEDURE — 99214 OFFICE O/P EST MOD 30 MIN: CPT | Performed by: NURSE PRACTITIONER

## 2025-02-13 RX ORDER — HYOSCYAMINE SULFATE 0.12 MG/1
TABLET ORAL
Qty: 90 | Refills: 6 | Status: ACTIVE

## 2025-02-13 RX ORDER — FAMOTIDINE 40 MG/1
40 TABLET, FILM COATED ORAL
Qty: 90 | Refills: 3 | Status: ACTIVE

## 2025-02-13 RX ORDER — PANTOPRAZOLE 40 MG/1
TABLET, DELAYED RELEASE ORAL
Qty: 90 | Refills: 3 | Status: ACTIVE

## 2025-08-13 ENCOUNTER — OFFICE (OUTPATIENT)
Dept: URBAN - METROPOLITAN AREA CLINIC 10 | Facility: CLINIC | Age: 70
End: 2025-08-13
Payer: COMMERCIAL

## 2025-08-13 VITALS
HEIGHT: 62 IN | OXYGEN SATURATION: 98 % | DIASTOLIC BLOOD PRESSURE: 56 MMHG | WEIGHT: 140 LBS | HEART RATE: 85 BPM | SYSTOLIC BLOOD PRESSURE: 116 MMHG

## 2025-08-13 DIAGNOSIS — K30 FUNCTIONAL DYSPEPSIA: ICD-10-CM

## 2025-08-13 DIAGNOSIS — C18.9 MALIGNANT NEOPLASM OF COLON, UNSPECIFIED: ICD-10-CM

## 2025-08-13 DIAGNOSIS — Z86.0109 PERSONAL HISTORY OF OTHER COLON POLYPS: ICD-10-CM

## 2025-08-13 DIAGNOSIS — R10.84 GENERALIZED ABDOMINAL PAIN: ICD-10-CM

## 2025-08-13 PROCEDURE — 99214 OFFICE O/P EST MOD 30 MIN: CPT | Performed by: NURSE PRACTITIONER

## 2025-08-13 RX ORDER — HYOSCYAMINE SULFATE 0.12 MG/1
TABLET ORAL
Qty: 90 | Refills: 11 | Status: ACTIVE

## 2025-08-13 RX ORDER — SUCRALFATE 1 G/1
TABLET ORAL
Qty: 56 | Refills: 0 | Status: ACTIVE
Start: 2025-08-13

## 2025-08-13 RX ORDER — POLYETHYLENE GLYCOL 3350, SODIUM SULFATE, SODIUM CHLORIDE, POTASSIUM CHLORIDE, ASCORBIC ACID, SODIUM ASCORBATE 140-9-5.2G
KIT ORAL
Qty: 1 | Refills: 0 | Status: ACTIVE
Start: 2025-08-13